# Patient Record
Sex: FEMALE
[De-identification: names, ages, dates, MRNs, and addresses within clinical notes are randomized per-mention and may not be internally consistent; named-entity substitution may affect disease eponyms.]

---

## 2020-12-16 PROBLEM — Z00.00 ENCOUNTER FOR PREVENTIVE HEALTH EXAMINATION: Noted: 2020-12-16

## 2020-12-18 ENCOUNTER — NON-APPOINTMENT (OUTPATIENT)
Age: 35
End: 2020-12-18

## 2020-12-18 ENCOUNTER — APPOINTMENT (OUTPATIENT)
Dept: OTOLARYNGOLOGY | Facility: CLINIC | Age: 35
End: 2020-12-18
Payer: COMMERCIAL

## 2020-12-18 VITALS
TEMPERATURE: 98 F | DIASTOLIC BLOOD PRESSURE: 80 MMHG | WEIGHT: 180 LBS | OXYGEN SATURATION: 99 % | HEART RATE: 78 BPM | BODY MASS INDEX: 28.25 KG/M2 | SYSTOLIC BLOOD PRESSURE: 132 MMHG | HEIGHT: 67 IN

## 2020-12-18 DIAGNOSIS — D44.0 NEOPLASM OF UNCERTAIN BEHAVIOR OF THYROID GLAND: ICD-10-CM

## 2020-12-18 DIAGNOSIS — M54.2 CERVICALGIA: ICD-10-CM

## 2020-12-18 DIAGNOSIS — R22.1 LOCALIZED SWELLING, MASS AND LUMP, NECK: ICD-10-CM

## 2020-12-18 DIAGNOSIS — J04.0 ACUTE LARYNGITIS: ICD-10-CM

## 2020-12-18 DIAGNOSIS — Z78.9 OTHER SPECIFIED HEALTH STATUS: ICD-10-CM

## 2020-12-18 DIAGNOSIS — Z83.3 FAMILY HISTORY OF DIABETES MELLITUS: ICD-10-CM

## 2020-12-18 DIAGNOSIS — Z82.49 FAMILY HISTORY OF ISCHEMIC HEART DISEASE AND OTHER DISEASES OF THE CIRCULATORY SYSTEM: ICD-10-CM

## 2020-12-18 DIAGNOSIS — E04.2 NONTOXIC MULTINODULAR GOITER: ICD-10-CM

## 2020-12-18 DIAGNOSIS — K21.9 ACUTE LARYNGITIS: ICD-10-CM

## 2020-12-18 PROCEDURE — 10005 FNA BX W/US GDN 1ST LES: CPT

## 2020-12-18 PROCEDURE — 31575 DIAGNOSTIC LARYNGOSCOPY: CPT

## 2020-12-18 PROCEDURE — 99205 OFFICE O/P NEW HI 60 MIN: CPT | Mod: 25

## 2020-12-18 PROCEDURE — 99072 ADDL SUPL MATRL&STAF TM PHE: CPT

## 2020-12-18 NOTE — CONSULT LETTER
[Dear  ___] : Dear  [unfilled], [Consult Letter:] : I had the pleasure of evaluating your patient, [unfilled]. [Please see my note below.] : Please see my note below. [Consult Closing:] : Thank you very much for allowing me to participate in the care of this patient.  If you have any questions, please do not hesitate to contact me. [Sincerely,] : Sincerely, [FreeTextEntry3] : \par Franklyn Henderson M.D., FACS, ECNU\par Director [facility] Par[facility]roid [facility]\par [facility]\par Certified in Thyroid/Parathyroid/Neck Ultrasound, ECNU/ AIUM\par \par , Department of Otolaryngology\par [facility]\par

## 2020-12-18 NOTE — HISTORY OF PRESENT ILLNESS
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  Approximately 2 weeks ago she was seen in the [facility] emergency room with a complaint of neck swelling, difficulty breathing and swallowing. She was noted to have mild thyromegaly and a CT scan of the neck was obtained that demonstrated a normal airway but mild enlargement of the left thyroid lobe and coarse calcifications without a discrete mass identified. There were no abnormal cervical lymph nodes. She was noted at the age of 22 to have a nodule in her thyroid gland and at that time a biopsy was reportedly benign.  She cannot remember which side of the gland was involved. She was started on levothyroxine and for approximately one year with a decrease in the size of the nodule but then stopped taking it. She is currently euthyroid. She has two healthy children with normal pregnancies. Sanjana denies recent shortness of breath, voice changes, true dysphagia or anterior neck pain.  She still has a sensation of pressure in her neck (3/10) more often when swallowing but less severe over the past week.  Nothing makes it better or worse.  She denies GERD symptoms.  There is no family history of thyroid cancer. Two maternal aunts have each hypothyroid. She denies any known radiation exposures in her youth. A dedicated thyroid ultrasound was done on 12/17/2020. This demonstrated mild thyromegaly with both lobes measuring 5.5 CM in maximum sagittal height.  There were no nodules in the right thyroid lobe. However the left thyroid lobe and isthmus both contained nodules. A left lobe circumscribed isoechoic posterior mid pole nodule is wider than tall with punctate echogenic foci measuring 1.7 x 1.2 x 0.8 CM- (TR4) moderately suspicious and the isthmus nodule is isoechoic measuring 2.6 x 0.9 x 2.0 CM without calcifications and vascular - (TR3). FNA biopsy was recommended based on size. She is also being evaluated for recurrent headaches (possibly migraines) and to see a neurologist.

## 2020-12-18 NOTE — PROCEDURE
[Image(s) Captured] : image(s) captured and filed [Unable to Cooperate with Mirror] : patient unable to cooperate with mirror [Gag Reflex] : gag reflex preventing mirror examination [Complicated Symptoms] : complicated symptoms requiring more thorough examination than provided by mirror [Topical Lidocaine] : topical lidocaine [Oxymetazoline HCl] : oxymetazoline HCl [Flexible Endoscope] : examined with the flexible endoscope [Serial Number: ___] : Serial Number: [unfilled] [FreeTextEntry3] : ..........................................[facility]\par ........................................ULTRASOUND-GUIDED THYROID FINE NEEDLE ASPIRATION BIOPSY REPORT\par \par NAME: PAOLA LOPEZ .........MR# [id number] .........: 1985 .........DATE: 2020 .........TIME: 1:01 PM\par \par HISTORY/ INDICATIONS: 35- year-old female with a left mid lobe thyroid nodule containing microcalcifications with infiltrative borders and concern for malignancy..\par \par EXAM: Real-time high-resolution ultrasound imaging of the thyroid gland was performed in the longitudinal and transverse planes with color power Doppler supplementation and image capture.\par \par FINDINGS: A left mid lobe nodule measuring 1.08 x 0.90 x 1.24 cm (longitudinal x AP x transverse) was identified and targeted for USG-FNA.  The nodule had the following ultrasonographic characteristics: solid, ayt-no-xhbaxziuyx, heterogeneous, irregular possibly infiltrative margins, multiple punctate echogenic foci, grade 2 vascularity on color Doppler, and wider-than-tall.  TR-4\par \par PROCEDURE: A time out took place and documented for correct patient identifiers, site and side of procedure.  After obtaining informed consent with discussion of risks, benefits and alternatives, the patient was positioned semi-supine, the skin was prepped with alcohol and 0.5 cc of 1% Lidocaine with 1:100,000 epinephrine was injected for local anesthesia. A #25-gauge needle was then passed along the perpendicular plane of the transducer, positioned within the nodule and confirmed with ultrasonography.  Multiple aspirations were made within the nodule with 4 separate needle punctures, four passes each.  Aspirates were directly placed into both cytolyt and RNA protect solutions for cytologic analysis, immunohistochemistry, and possible molecular genomic diagnostics.  The patient tolerated the procedure well without complications and was discharged with signed post-procedure instructions indicating the importance of following up on all results. \par \par ASSESSMENT & PLAN: Successful USG-FNA of a left mid lobe nodule was well tolerated without complications. The patient will be contacted to review the cytologic findings as soon as available for further treatment planning.  A discussion took place with the patient who accepted the responsibility to call the office to review the cytology results if no communication occurs within two weeks. Follow-up imaging in 1 year is recommended if the cytology is reported as benign, Kelly Category II.\par \par Electronically signed by LEONARDO LEARY M.D., FACS on 2020, 1:10 PM.\par \par [facility]: [address] [place] [address], [address],  [place] [address]\par [phone] (voice), [phone] (fax) [de-identified] : The nasal septum is minimally deviated to the left. There are no masses or polyps and the nasal mucosa and secretions are normal. The choanae and posterior nasopharynx are normal without masses or drainage. The Eustachian tube orifices appear patent. The pharynx, including the posterior and lateral pharyngeal walls, the vallecula and base of tongue are normal without ulcerations, lesions or masses. The hypopharynx including the pyriform sinuses open well without pooling of secretions, mucosal lesions or masses. The supraglottic larynx including the epiglottis, petiole, arytenoids, glossoepiglottic, aryepiglottic and pharyngoepiglottic folds are normal without mucosal lesions, ulcerations or masses. The glottis reveals normal false vocal folds. The true vocal folds are glistening white, tense and of equal length, without paralysis, having symmetric mobility on adduction and abduction. There are no mucosal lesions, nodules, cysts, erythroplasia or leukoplakia. The posterior cricoid area has healthy pink mucosa in the interarytenoid area and esophageal inlet. There is mild thickening/edema of the interarytenoid mucosa suggestive of posterior laryngitis from laryngopharyngeal acid reflux disease. The trachea is clear without narrowing in the immediate subglottic region, without deviation or lesions. \par

## 2020-12-18 NOTE — REASON FOR VISIT
[FreeTextEntry2] : a surgical consultation regarding a NTMNG with several nodules and calcifications causinf neck pressure x 2 weeks [FreeTextEntry1] : Referred by Cardiologist Meliza Wilkins MD

## 2020-12-28 LAB
25(OH)D3 SERPL-MCNC: 21.8 NG/ML
ALBUMIN SERPL ELPH-MCNC: 4.8 G/DL
ALP BLD-CCNC: 90 U/L
ALT SERPL-CCNC: 28 U/L
ANION GAP SERPL CALC-SCNC: 12 MMOL/L
AST SERPL-CCNC: 15 U/L
BILIRUB SERPL-MCNC: 0.2 MG/DL
BUN SERPL-MCNC: 8 MG/DL
CALCIUM SERPL-MCNC: 9.7 MG/DL
CALCIUM SERPL-MCNC: 9.7 MG/DL
CHLORIDE SERPL-SCNC: 103 MMOL/L
CO2 SERPL-SCNC: 26 MMOL/L
CREAT SERPL-MCNC: 0.92 MG/DL
GLUCOSE SERPL-MCNC: 91 MG/DL
PARATHYROID HORMONE INTACT: 51 PG/ML
POTASSIUM SERPL-SCNC: 4 MMOL/L
PROT SERPL-MCNC: 7.8 G/DL
SODIUM SERPL-SCNC: 142 MMOL/L
T4 FREE SERPL-MCNC: 1 NG/DL
THYROGLOB AB SERPL-ACNC: 257 IU/ML
THYROPEROXIDASE AB SERPL IA-ACNC: 89.2 IU/ML
TSH SERPL-ACNC: 1.04 UIU/ML

## 2021-01-11 ENCOUNTER — APPOINTMENT (OUTPATIENT)
Dept: OTOLARYNGOLOGY | Facility: CLINIC | Age: 36
End: 2021-01-11

## 2022-04-11 PROBLEM — J04.0 REFLUX LARYNGITIS: Status: ACTIVE | Noted: 2020-12-18
